# Patient Record
Sex: MALE | Race: WHITE | NOT HISPANIC OR LATINO | URBAN - METROPOLITAN AREA
[De-identification: names, ages, dates, MRNs, and addresses within clinical notes are randomized per-mention and may not be internally consistent; named-entity substitution may affect disease eponyms.]

---

## 2018-12-21 ENCOUNTER — OFFICE VISIT - RIVER FALLS (OUTPATIENT)
Dept: FAMILY MEDICINE | Facility: CLINIC | Age: 68
End: 2018-12-21

## 2018-12-21 ASSESSMENT — MIFFLIN-ST. JEOR: SCORE: 1562.05

## 2018-12-24 LAB
CHOLEST SERPL-MCNC: 179 MG/DL
CREAT SERPL-MCNC: 0.9 MG/DL
GLUCOSE BLD-MCNC: 91 MG/DL
HBA1C MFR BLD: 5.4 %
HDLC SERPL-MCNC: 102 MG/DL
LDLC SERPL CALC-MCNC: 63 MG/DL
PSA SERPL-MCNC: 0.93 NG/ML
TRIGL SERPL-MCNC: 72 MG/DL
VLDL-CHOLESTEROL: 14

## 2019-01-03 ENCOUNTER — OFFICE VISIT - RIVER FALLS (OUTPATIENT)
Dept: FAMILY MEDICINE | Facility: CLINIC | Age: 69
End: 2019-01-03

## 2019-01-03 LAB
ALT SERPL W P-5'-P-CCNC: 15 UNIT/L
AST SERPL W P-5'-P-CCNC: 18 UNIT/L
BILIRUB SERPL-MCNC: 0.6 MG/DL
CHOLEST SERPL-MCNC: 179 MG/DL
CREAT SERPL-MCNC: 0.9 MG/DL
GLUCOSE BLD-MCNC: 91 MG/DL
HBA1C MFR BLD: 5.4 %
HCT VFR BLD AUTO: 47 %
HDLC SERPL-MCNC: 102 MG/DL
HGB BLD-MCNC: 15.6 G/DL
LDLC SERPL CALC-MCNC: 63 MG/DL
PLATELET # BLD AUTO: 307 X10
POTASSIUM BLD-SCNC: 4.9 MEQ/L
PSA SERPL-MCNC: 0.93 NG/ML
SODIUM SERPL-SCNC: 135 MEQ/L
TRIGL SERPL-MCNC: 72 MG/DL
WBC # BLD AUTO: 8.99 X10

## 2019-01-03 ASSESSMENT — MIFFLIN-ST. JEOR: SCORE: 1537.56

## 2021-08-06 ENCOUNTER — OFFICE VISIT - RIVER FALLS (OUTPATIENT)
Dept: FAMILY MEDICINE | Facility: CLINIC | Age: 71
End: 2021-08-06

## 2021-08-06 ENCOUNTER — COMMUNICATION - RIVER FALLS (OUTPATIENT)
Dept: FAMILY MEDICINE | Facility: CLINIC | Age: 71
End: 2021-08-06

## 2021-08-06 LAB
BUN SERPL-MCNC: 13 MG/DL (ref 7–25)
BUN/CREAT RATIO - HISTORICAL: ABNORMAL (ref 6–22)
CALCIUM SERPL-MCNC: 9.4 MG/DL (ref 8.6–10.3)
CHLORIDE BLD-SCNC: 94 MMOL/L (ref 98–110)
CO2 SERPL-SCNC: 31 MMOL/L (ref 20–32)
CREAT SERPL-MCNC: 0.81 MG/DL (ref 0.7–1.18)
EGFRCR SERPLBLD CKD-EPI 2021: 89 ML/MIN/1.73M2
GLUCOSE BLD-MCNC: 75 MG/DL (ref 65–99)
POTASSIUM BLD-SCNC: 4.4 MMOL/L (ref 3.5–5.3)
SODIUM SERPL-SCNC: 131 MMOL/L (ref 135–146)

## 2022-02-12 VITALS
WEIGHT: 149.7 LBS | SYSTOLIC BLOOD PRESSURE: 126 MMHG | OXYGEN SATURATION: 97 % | TEMPERATURE: 97.3 F | HEART RATE: 73 BPM | DIASTOLIC BLOOD PRESSURE: 84 MMHG | RESPIRATION RATE: 16 BRPM

## 2022-02-12 VITALS
HEART RATE: 68 BPM | WEIGHT: 170 LBS | HEART RATE: 72 BPM | RESPIRATION RATE: 20 BRPM | HEIGHT: 73 IN | DIASTOLIC BLOOD PRESSURE: 90 MMHG | BODY MASS INDEX: 21.81 KG/M2 | SYSTOLIC BLOOD PRESSURE: 122 MMHG | BODY MASS INDEX: 22.53 KG/M2 | SYSTOLIC BLOOD PRESSURE: 140 MMHG | WEIGHT: 164.6 LBS | TEMPERATURE: 99 F | DIASTOLIC BLOOD PRESSURE: 82 MMHG | TEMPERATURE: 96.6 F | HEIGHT: 73 IN | OXYGEN SATURATION: 95 %

## 2022-02-15 NOTE — NURSING NOTE
Comprehensive Intake Entered On:  1/3/2019 8:27 AM CST    Performed On:  1/3/2019 8:22 AM CST by Jolie Armenta CMA               Summary   Chief Complaint :   Pre-op Right Wrist: DOS: 1/3/19; [facility]    Menstrual Status :   N/A   Weight Measured :   164.6 lb(Converted to: 164 lb 10 oz, 74.66 kg)    Height Measured :   72.5 in(Converted to: 6 ft 0 in, 184.15 cm)    Body Mass Index :   22.01 kg/m2   Body Surface Area :   1.95 m2   Systolic Blood Pressure :   122 mmHg   Diastolic Blood Pressure :   82 mmHg (HI)    Mean Arterial Pressure :   95 mmHg   Peripheral Pulse Rate :   68 bpm   BP Site :   Left arm   Pulse Site :   Radial artery   BP Method :   Manual   HR Method :   Manual   Temperature Tympanic :   96.6 DegF(Converted to: 35.9 DegC)  (LOW)    Jolie Armenta CMA - 1/3/2019 8:22 AM CST   Health Status   Allergies Verified? :   Yes   Medication History Verified? :   Yes   Immunizations Current :   Yes   Medical History Verified? :   Yes   Pre-Visit Planning Status :   Completed   Tobacco Use? :   Current every day smoker   Tobacco Cessation Review :   Not ready to quit   Jolie Armenta CMA - 1/3/2019 8:22 AM CST   Consents   Consent for Immunization Exchange :   Consent Granted   Consent for Immunizations to Providers :   Consent Granted   Jolie Armenta CMA - 1/3/2019 8:22 AM CST   Meds / Allergies   (As Of: 1/3/2019 8:27:28 AM CST)   Allergies (Active)   Cialis  Estimated Onset Date:   Unspecified ; Reactions:   Dry mouth, Constipation ; Created By:   Zaina Rios; Reaction Status:   Active ; Category:   Drug ; Substance:   Cialis ; Type:   Allergy ; Updated By:   Zaina Rios; Reviewed Date:   1/3/2019 8:25 AM CST        Medication List   (As Of: 1/3/2019 8:27:28 AM CST)   Prescription/Discharge Order    acetaminophen-hydrocodone  :   acetaminophen-hydrocodone ; Status:   Prescribed ; Ordered As Mnemonic:   acetaminophen-hydrocodone 325 mg-5 mg oral tablet ; Simple Display Line:   See Instructions, 1-2  tab(s) po q6 hrs, 30 tab(s), 0 Refill(s) ; Ordering Provider:   Charly Ya PA-C; Catalog Code:   acetaminophen-hydrocodone ; Order Dt/Tm:   12/21/2018 9:47:58 AM

## 2022-02-15 NOTE — PROGRESS NOTES
Patient:   REDD HUI            MRN: [id number]            FIN: [id number]               Age:   70 years     Sex:  Male     :  1950   Associated Diagnoses:   Hyponatremia; Alcohol abuse; Abrasion   Author:   Josse Ang MD      Visit Information      Date of Service: 2021 10:12 am  Performing Location: [facility]  Encounter#: [id number]      Chief Complaint   2021 10:15 AM T    Hospital follow up.   Chief complaint and symptoms noted above confirmed with patient.      Subjective   Chief complaint 2021 10:15 AM T    Hospital follow up..     Low sodium, alcoholism.  Doing well.  No beer since D/C.  Reviewed D/C summary  Reviewed Medications.  Advised to continue MVI, Thiamine and folate.         Health Status   Allergies:    Allergic Reactions (Selected)  Severity Not Documented  Cialis (Dry mouth and constipation)   Medications:  (Selected)   Documented Medications  Documented  Multiple Vitamins oral tablet: 1 tab(s), Oral, daily, 0 Refill(s), Type: Maintenance  Vitamin B1 100 mg oral tablet: = 0.5 tab(s) ( 50 mg ), Oral, daily, 0 Refill(s), Type: Maintenance  amoxicillin-clavulanate 875 mg-125 mg oral tablet: = 1 tab(s), Oral, q12 hrs, 0 Refill(s), Type: Maintenance  folic acid 1 mg oral tablet: = 1 tab(s) ( 1 mg ), Oral, daily, 0 Refill(s), Type: Maintenance   Problem list:    All Problems (Selected)  Unspecified sensorineural hearing loss / SNOMED CT 166448214 / Confirmed  Tobacco user / SNOMED CT 732170472 / Probable      Objective   Vital Signs   2021 10:15 AM CDT Temperature Tympanic 97.3 DegF  LOW    Peripheral Pulse Rate 73 bpm    Respiratory Rate 16 br/min    Systolic Blood Pressure 126 mmHg    Diastolic Blood Pressure 84 mmHg  HI    Mean Arterial Pressure 98 mmHg    BP Site Right arm    BP Method Manual    Oxygen Saturation 97 %      Measurements from flowsheet : Measurements   2021 10:15 AM CDT Height/Length Estimated 72.5 in    Weight Measured  - Standard 149.7 lb      General:  Alert and oriented, No acute distress.    Eye:  Pupils are equal, round and reactive to light, Extraocular movements are intact, Normal conjunctiva.    HENT:  Normocephalic, Tympanic membranes are clear, Normal hearing, Nose healing.    Integumentary:  Abrasion to right knee and lower leg..       Impression and Plan   Assessment and Plan:          Diagnosis: Hyponatremia (JYW55-IX E87.1), Alcohol abuse (DBJ01-VW F10.10), Abrasion (ARE89-LH T14.8XXA).         Course: Improving, Progressing as expected.    Orders     Orders   Pharmacy:  Silvadene 1% topical cream (Prescribe): 1 deborah, Topical, bid, # 50 gm, 0 Refill(s), Type: Acute, Pharmacy: [email or web address] #[id number], 1 deborah Topical bid, 149.7, lb, 8/6/2021 10:15 AM CDT, Weight Measured.     Orders (Selected)   Outpatient Orders  Ordered (Dispatched)  Basic Metabolic Panel* ([facility]): Specimen Type: Serum, Collection Date: 08/06/21 10:35:00 CDT.     Will follow up ENT and [facility] rehab.     .

## 2022-02-15 NOTE — PROGRESS NOTES
Patient:   REDD HUI            MRN: [id number]            FIN: [id number]               Age:   68 years     Sex:  Male     :  1950   Associated Diagnoses:   Pre-op exam; Right wrist fracture   Author:   Kristi Freeman MD      Chief Complaint   1/3/2019 8:22 AM CST     Pre-op Right Wrist: DOS: 1/3/19; [facility]     Patient with a fall down the stairs on 18. Diagnosed with wrist fracture and rib fractures.  Ribs are improving.   Referred to [facility] for wrist fracture.  Will have ORIF with fixation with plates and screws per patient later today.  [facility] Had annual exam in December. Labs from recent exam in December reviewed.       Review of Systems   Eye:  Negative.    Ear/Nose/Mouth/Throat:  Decreased hearing.    Respiratory:  Negative.    Cardiovascular:  Negative.    Gastrointestinal:  Negative.    Genitourinary:  Negative.    Hematology/Lymphatics:  Negative.    Endocrine:  Negative.    Immunologic:  Negative.    Musculoskeletal:  negative except per HPI.    Integumentary:  Negative.    Neurologic:  Negative.    Psychiatric:  Negative.       Health Status   Allergies:    Allergic Reactions (All)  Severity Not Documented  Cialis (Dry mouth and constipation)   Medications:  (Selected)   Prescriptions  Prescribed  acetaminophen-hydrocodone 325 mg-5 mg oral tablet: See Instructions, Instructions: 1-2 tab(s) po q6 hrs, # 30 tab(s), 0 Refill(s), Type: Maintenance, Pharmacy: [facility] #[id number], 1-2 tab(s) po q6 hrs   Problem list:    All Problems  Sensorineural Hearing Loss / ICD-9-.10 / Confirmed      Histories   Past Medical History:    Active  Sensorineural Hearing Loss (ICD-9-.10)   Family History:    Diabetes mellitus type II  Mother  [place] - Cancer of prostate  Father     Procedure history:    Thumb surgery in  at 54 Years.  Comments:  2012 10:27 AM CDT - Iliana Kaur CMA thumb  Hemorrhoidectomy ([id number]).  Comments:  1/3/2019  8:54 AM CST - Pete SPICER, Kristi  hemorrhoidectomy x 5, all remote   Social History:        Alcohol Assessment: Current            Current, Beer (12 oz), 3-5 times per week, 3 drinks/episode average.  10 drinks/episode maximum.      Tobacco Assessment: Current      Physical Examination   Vital Signs   1/3/2019 8:22 AM CST Temperature Tympanic 96.6 DegF  LOW    Peripheral Pulse Rate 68 bpm    Pulse Site Radial artery    HR Method Manual    Systolic Blood Pressure 122 mmHg    Diastolic Blood Pressure 82 mmHg  HI    Mean Arterial Pressure 95 mmHg    BP Site Left arm    BP Method Manual      Measurements from flowsheet : Measurements   1/3/2019 8:22 AM CST Height Measured - Standard 72.5 in    Weight Measured - Standard 164.6 lb    BSA 1.95 m2    Body Mass Index 22.01 kg/m2      General:  Alert and oriented, No acute distress.    Eye:  Pupils are equal, round and reactive to light, Extraocular movements are intact, Normal conjunctiva.    HENT:  Normocephalic, Tympanic membranes are clear, Oral mucosa is moist, No pharyngeal erythema.    Neck:  Supple, Non-tender, No lymphadenopathy, No thyromegaly.    Respiratory:  Lungs are clear to auscultation, Respirations are non-labored.    Cardiovascular:  Normal rate, Regular rhythm, No murmur, right chest wall tenderness.    Gastrointestinal:  Soft, Non-distended.    Musculoskeletal:  Normal range of motion, Normal strength, right wrist bruised, in brace.    Neurologic:  Alert, Oriented, Normal motor function, No focal deficits.    Psychiatric:  Cooperative, Appropriate mood & affect.       Review / Management   Results review:  Lab results   12/12/2018 7:13 AM CST Sodium Level  mEq/L    Potassium Level TR 4.9 mEq/L    Glucose Level TR 91 mg/dL    BUN TR 10 mg/dL    Creatinine TR 0.9 mg/dL    Calcium TR 9.0 mEq/dL    Magnesium Level TR 2.2 mg/dL    Bilirubin Total TR 0.6 mg/dL    Alkaline Phosphatase TR 85 unit/L    AST TR 18 unit/L    ALT TR 15 unit/L    Protein Total  TR 8.1 gm/dL    Albumin Level TR 3.6 g/dL    Hgb A1c TR 5.4 %    Cholesterol  mg/dL    HDL  mg/dL    LDL TR 63 mg/dL    VLDL TR 14    Triglyceride TR 72 mg/dL    PSA TR 0.93 ng/mL    WBC TR 8.99 x10^3/uL    RBC TR 4.95 x10^6/uL    Hgb TR 15.6 g/dL    Hct TR 47 %    MCV TR 94.9 fL    MCH TR 31.5 pg    MCHC TR 33.2 gm/dL    RDW TR 12.6 %    Platelet  x10^3/uL    MPV (Mean Platelet Volume) TR 8.4 fL    Lymphocytes TR 17.9 %    Absolute Lymphocytes TR 1.6 cells/uL    Neutrophils TR 61 %    Absolute Neutrophils TR 5.4 cells/uL    Monocytes TR 12 %    Absolute Monocytes TR 1.0 cells/uL    Eosinophils TR 8 %    Absolute Eosinophils TR 0.7 cells/uL    Basophils TR 0.9 %    Absolute Basophils TR 0.0 cells/uL    Absolute Immature Granulocytes TR 0.0 x10^3/uL   .    ECG interpretation:  Within normal limits, Normal sinus rhythm, bradycardic, HR 51, no prior for comparison.       Impression and Plan   Diagnosis     Pre-op exam (WKG99-NI Z01.818).     Right wrist fracture (IRP80-UI S62.101A).     Orders     Patient is stable and appropriate to proceed with surgery..

## 2022-02-15 NOTE — TELEPHONE ENCOUNTER
---------------------  From: Yolie Fernandez CMA   Sent: 1/2/2019 2:28:50 PM CST  Subject: phone note- preop request     Phone Message    PCP:    [facility]- last seen 2013      Time of Call:  1:54pm       Person Calling:  WifeMarisol Johnson  Phone number:  [phone]    Returned call at: 2:17pm    Note:  Patients wife called asking for Yariel to be worked in for preop today. Pt having surgery tomorrow morning. Patient last seen Charly Ya PA-C on 12-21-18 for wrist/rib injury. Advised wife that - [facility] does has openings this evening and that our one physician here today is completely booked. Patient should call  clinic to schedule. Wife requesting clinic note from 12-21-18 visit. Advised her that Yariel would need to be with her to  or have ADRIENNE signed. Wife understands. Clinic note at  of [facility] to .     Last office visit and reason:  12/21/18

## 2022-02-15 NOTE — LETTER
(Inserted Image. Unable to display)   July 02, 2019      REDD HUI   [address] [place], [place] [id number]        Dear REDD,      Thank you for selecting [facility] (previously [facility]) for your healthcare needs.     Our records indicate you are due for the following services:     Annual Physical    To schedule an appointment or if you have further questions, please contact your primary clinic:   [facility] [place] [facility]          [phone]   [facility]    [phone]             [facility]         [phone]      Powered by [employer]    Sincerely,    Kristi Freeman M.D.

## 2022-02-15 NOTE — RESULTS
(Inserted Image. Unable to display)   (Inserted Image. Unable to display)     [address] [address]  Phone [phone]  Fax [phone]    ELECTROCARDIOGRAM REPORT    REDD HUI :  1950  DATE OF EXAM:  2019 MRN:  [id number]  Ordering HCP:  Kristi Freeman MD       Indication:  Preoperative assessment.    Findings:   Sinus rhythm.  No acute abnormality.  No prior for comparison.  Bradycardia noted with heart rate of 51.      Impression:  Sinus bradycardia.    Kristi Freeman MD   Interpreting HCP    LUIS/  D:  2019  T:  2019    ELECTROCARDIOGRAM REPORT

## 2022-02-15 NOTE — LETTER
(Inserted Image. Unable to display)   [address] [place] [address] [phone] (phone)  [phone] (fax)  REDD HUI     [address] [place] [id number]        Dear REDD,    Thank you for selecting our practice as your care provider. Below you will find the results and recent diagnostic testings outcomes we have reviewed.        These results are improved.  It will be important to abstain from all alcohol.      Result Name Current Result Reference Range   Sodium Level (mmol/L) ((L)) 131 8/6/2021 135 - 146   Potassium Level (mmol/L)  4.4 8/6/2021 3.5 - 5.3   Chloride Level (mmol/L) ((L)) 94 8/6/2021 98 - 110   CO2 Level (mmol/L)  31 8/6/2021 20 - 32   Glucose Level (mg/dL)  75 8/6/2021 65 - 99   BUN (mg/dL)  13 8/6/2021 7 - 25   Creatinine Level (mg/dL)  0.81 8/6/2021 0.70 - 1.18   eGFR (mL/min/1.73m2)  89 8/6/2021 > OR = 60 -    Calcium Level (mg/dL)  9.4 8/6/2021 8.6 - 10.3       Please contact my practice at the number listed above if you have any questions or concerns.     Sincerely,        Josse Ang MD, FAAFP      What do your labs mean? Below is a glossary of commonly ordered labs:  LDL - Bad Cholesterol HDL - Good Cholesterol AST/ALT - Liver Function  PSA -  Prostate  TSH - Thyroid  Hgb (Hemoglobin) - Red Blood Cells  Cr/Creatinine/Microalbumin/ BUN/eGFR - Kidney Function HgbA1c - Diabetes Test

## 2022-02-15 NOTE — PROGRESS NOTES
Patient:   REDD HUI            MRN: [id number]            FIN: [id number]               Age:   68 years     Sex:  Male     :  1950   Associated Diagnoses:   Rib pain; Right wrist pain; Right radial head fracture; Right rib fracture   Author:   Charly Ya PA-C.      Visit Information   Accompanied by:  Family member.       Chief Complaint   2018 8:42 AM CST   New pt here for Right wrist, right forearm pain and right side/rib pain from fall he had down some steps yesterday about 430 pm.      History of Present Illness   as above  took ibuprofen and tyelnol  no head injury, no LOC      Health Status   Allergies:    Allergic Reactions (Selected)  Severity Not Documented  Cialis (Dry mouth and constipation)   Medications:    Medications          No Recorded Medications   Problem list:    All Problems  Sensorineural Hearing Loss / ICD-9-.10 / Confirmed      Histories   Past Medical History:    Active  Sensorineural Hearing Loss (389.10)   Family History:    Diabetes mellitus type II  Mother  [place] - Cancer of prostate  Father     Procedure history:    Thumb surgery in  at 54 Years.  Comments:  2012 10:27 AM JESUST - Iliana Kaur CMA   Social History:        Alcohol Assessment: Current            Current, Beer (12 oz), 3-5 times per week, 3 drinks/episode average.  10 drinks/episode maximum.      Tobacco Assessment: Current      Physical Examination   Vital Signs   2018 8:42 AM CST Temperature Tympanic 99 DegF    Peripheral Pulse Rate 72 bpm    Pulse Site Radial artery    Respiratory Rate 20 br/min    Systolic Blood Pressure 140 mmHg  HI    Diastolic Blood Pressure 90 mmHg  HI    Mean Arterial Pressure 107 mmHg    BP Site Right arm    Oxygen Saturation 95 %      Measurements from flowsheet : Measurements   2018 8:42 AM CST Height Measured - Standard 72.5 in    Weight Measured - Standard 170 lb    BSA 1.98 m2    Body Mass Index 22.74 kg/m2      General:  Mild distress.     Respiratory:  Lungs are clear to auscultation, pain with palpation along right T6 rib near mid clavicular line.    Musculoskeletal:  right wrist is swollen and with ulnar deviation,  good sensation and capillary refill in fingertips, pain with palpation over wrist, no elbow pain, good flexion and extension at elboow.       Review / Management   Radiology results   displaced, comminuted fx of radial head  nondisplaced fx of right T5 rib      Impression and Plan   Diagnosis     Rib pain (NGY29-VF R07.81).     Right wrist pain (LNX54-FZ M25.531).     Right radial head fracture (JPY70-OJ S52.121A).     Right rib fracture (NFI08-HI S22.31XA).     Summary:  will refer to ortho for the wrist fx  given a splint for stabilization  given a comfort rib belt for his rib injury, will give vicodin to use prn pain, can use ibuprofen in between doses, use ice to wrist.    Orders     Orders   Requests (Radiology):  XR Wrist Complete w/ Navicular Right (Request) (Order): Right wrist pain  Rib pain  XR Ribs Right (Request) (Order): Right wrist pain  Rib pain.     Orders   Requests (Consults / Referrals):  Referral (Request) (Order): 12/21/2018 9:45 AM CST, Referred to: Orthopaedics, Reason for referral: comminuted displaced fx of right radial head, Priority: STAT, Right radial head fracture.     Orders   Pharmacy:  acetaminophen-hydrocodone 325 mg-5 mg oral tablet (Prescribe): See Instructions, Instructions: 1-2 tab(s) po q6 hrs, # 30 tab(s), 0 Refill(s), Type: Maintenance, Pharmacy: [facility] #[id number], 1-2 tab(s) po q6 hrs.     Orders   Charges (Evaluation and Management):  10911 office outpatient visit 15 minutes (Charge) (Order): Quantity: 1, Right radial head fracture  Right rib fracture.     Orders   Charges (Evaluation and Management):  63775 office outpatient visit 15 minutes (Charge) (Delete).     Orders   Charges (Evaluation and Management):  91343 office outpatient new 20 minutes (Charge) (Order): Quantity: 1, Right  radial head fracture  Right rib fracture  Charges:   wrist cock-up non-molded (Charge) (Order): Quantity: 1, Right radial head fracture  Right rib fracture   conform band s w>=3 in less than 5 in/yd (Charge) (Order): Quantity: 1, Right radial head fracture  Right rib fracture.

## 2022-02-15 NOTE — NURSING NOTE
Comprehensive Intake Entered On:  8/6/2021 10:24 AM CDT    Performed On:  8/6/2021 10:15 AM CDT by Shanon Rose CMA               Summary   Chief Complaint :   Hospital follow up.   Menstrual Status :   N/A   Weight Measured :   149.7 lb(Converted to: 149 lb 11 oz, 67.903 kg)    Height/Length Estimated :   72.5 in(Converted to: 6 ft 0 in, 184.15 cm)    Systolic Blood Pressure :   126 mmHg   Diastolic Blood Pressure :   84 mmHg (HI)    Mean Arterial Pressure :   98 mmHg   Peripheral Pulse Rate :   73 bpm   BP Site :   Right arm   BP Method :   Manual   Temperature Tympanic :   97.3 DegF(Converted to: 36.3 DegC)  (LOW)    Respiratory Rate :   16 br/min   Oxygen Saturation :   97 %   Shanon Rose CMA - 8/6/2021 10:15 AM CDT   Health Status   Allergies Verified? :   Yes   Medication History Verified? :   Yes   Immunizations Current :   Yes   Medical History Verified? :   Yes   Pre-Visit Planning Status :   Completed   Tobacco Use? :   Current every day smoker   Shanon Rose CMA - 8/6/2021 10:15 AM CDT   Consents   Consent for Immunization Exchange :   Consent Granted   Consent for Immunizations to Providers :   Consent Granted   Shanon Rose CMA - 8/6/2021 10:15 AM CDT   Meds / Allergies   (As Of: 8/6/2021 10:24:58 AM CDT)   Allergies (Active)   Cialis  Estimated Onset Date:   Unspecified ; Reactions:   Dry mouth, Constipation ; Created By:   Zaina Rios; Reaction Status:   Active ; Category:   Drug ; Substance:   Cialis ; Type:   Allergy ; Updated By:   Zaina Rios; Reviewed Date:   8/6/2021 10:19 AM CDT        Medication List   (As Of: 8/6/2021 10:24:58 AM CDT)   Prescription/Discharge Order    acetaminophen-hydrocodone  :   acetaminophen-hydrocodone ; Status:   Completed ; Ordered As Mnemonic:   acetaminophen-hydrocodone 325 mg-5 mg oral tablet ; Simple Display Line:   See Instructions, 1-2 tab(s) po q6 hrs, 30 tab(s), 0 Refill(s) ; Ordering Provider:   Charly Ya PA-C; Catalog Code:    acetaminophen-hydrocodone ; Order Dt/Tm:   12/21/2018 9:47:58 AM CST            Home Meds    amoxicillin-clavulanate  :   amoxicillin-clavulanate ; Status:   Documented ; Ordered As Mnemonic:   amoxicillin-clavulanate 875 mg-125 mg oral tablet ; Simple Display Line:   1 tab(s), Oral, q12 hrs, for 10 day(s), 0 Refill(s) ; Catalog Code:   amoxicillin-clavulanate ; Order Dt/Tm:   8/6/2021 10:21:45 AM CDT          folic acid  :   folic acid ; Status:   Documented ; Ordered As Mnemonic:   folic acid 1 mg oral tablet ; Simple Display Line:   1 mg, 1 tab(s), Oral, daily, for 14 day(s), 0 Refill(s) ; Catalog Code:   folic acid ; Order Dt/Tm:   8/6/2021 10:21:45 AM CDT          multivitamin  :   multivitamin ; Status:   Documented ; Ordered As Mnemonic:   Multiple Vitamins oral tablet ; Simple Display Line:   1 tab(s), Oral, daily, 0 Refill(s) ; Catalog Code:   multivitamin ; Order Dt/Tm:   8/6/2021 10:22:13 AM CDT          thiamine  :   thiamine ; Status:   Documented ; Ordered As Mnemonic:   Vitamin B1 100 mg oral tablet ; Simple Display Line:   50 mg, 0.5 tab(s), Oral, daily, for 7 day(s), 0 Refill(s) ; Catalog Code:   thiamine ; Order Dt/Tm:   8/6/2021 10:21:45 AM CDT            Social History   Social History   (As Of: 8/6/2021 10:24:58 AM CDT)   Alcohol:  Current      Beer (12 oz), Daily, 6 drinks/episode average.   (Last Updated: 8/6/2021 7:23:16 AM CDT by Yeny Coyle)          Tobacco:  Current      Smoker, current status unknown   (Last Updated: 8/6/2021 10:16:23 AM CDT by Shanon Rose CMA)          Electronic Cigarette/Vaping:        Electronic Cigarette Use: Never.   (Last Updated: 8/6/2021 10:16:27 AM CDT by Shanon Rose CMA)          Home/Environment:        Marital status: .  Spouse/Partner name: Elizabeth.   (Last Updated: 8/6/2021 7:27:17 AM CDT by Yeny Coyle)